# Patient Record
Sex: MALE | Race: WHITE | HISPANIC OR LATINO | Employment: OTHER | URBAN - METROPOLITAN AREA
[De-identification: names, ages, dates, MRNs, and addresses within clinical notes are randomized per-mention and may not be internally consistent; named-entity substitution may affect disease eponyms.]

---

## 2017-07-31 ENCOUNTER — PREPPED CHART (OUTPATIENT)
Dept: URBAN - METROPOLITAN AREA CLINIC 39 | Facility: CLINIC | Age: 82
End: 2017-07-31

## 2017-08-30 ASSESSMENT — TONOMETRY
OD_IOP_MMHG: 22
OS_IOP_MMHG: 19

## 2017-08-30 ASSESSMENT — VISUAL ACUITY
OS_SC: 20/40+2
OD_SC: 20/40

## 2017-09-01 ENCOUNTER — ESTABLISHED PATIENT (OUTPATIENT)
Dept: URBAN - METROPOLITAN AREA CLINIC 39 | Facility: CLINIC | Age: 82
End: 2017-09-01

## 2017-09-01 DIAGNOSIS — H43.813: ICD-10-CM

## 2017-09-01 DIAGNOSIS — Z96.1: ICD-10-CM

## 2017-09-01 DIAGNOSIS — H35.3122: ICD-10-CM

## 2017-09-01 DIAGNOSIS — H35.372: ICD-10-CM

## 2017-09-01 DIAGNOSIS — H35.3211: ICD-10-CM

## 2017-09-01 PROCEDURE — 1036F TOBACCO NON-USER: CPT

## 2017-09-01 PROCEDURE — G8756 NO BP MEASURE DOC: HCPCS

## 2017-09-01 PROCEDURE — 92012 INTRM OPH EXAM EST PATIENT: CPT

## 2017-09-01 PROCEDURE — 92242 FLUORESCEIN&ICG ANGIOGRAPHY: CPT

## 2017-09-01 PROCEDURE — 2019F DILATED MACUL EXAM DONE: CPT

## 2017-09-01 PROCEDURE — 4177F TALK PT/CRGVR RE AREDS PREV: CPT

## 2017-09-01 PROCEDURE — G8428 CUR MEDS NOT DOCUMENT: HCPCS

## 2017-09-01 PROCEDURE — 67028 INJECTION EYE DRUG: CPT

## 2017-09-01 PROCEDURE — 92134 CPTRZ OPH DX IMG PST SGM RTA: CPT

## 2017-09-01 ASSESSMENT — VISUAL ACUITY
OD_SC: 20/30-1+1
OS_PH: 20/30-2
OS_SC: 20/40-1+2

## 2017-09-01 ASSESSMENT — TONOMETRY
OD_IOP_MMHG: 18
OS_IOP_MMHG: 17

## 2017-09-29 ENCOUNTER — ESTABLISHED PATIENT (OUTPATIENT)
Dept: URBAN - METROPOLITAN AREA CLINIC 39 | Facility: CLINIC | Age: 82
End: 2017-09-29

## 2017-09-29 DIAGNOSIS — Z96.1: ICD-10-CM

## 2017-09-29 DIAGNOSIS — H43.813: ICD-10-CM

## 2017-09-29 DIAGNOSIS — H35.372: ICD-10-CM

## 2017-09-29 DIAGNOSIS — H35.3122: ICD-10-CM

## 2017-09-29 DIAGNOSIS — H35.3211: ICD-10-CM

## 2017-09-29 PROCEDURE — 92012 INTRM OPH EXAM EST PATIENT: CPT

## 2017-09-29 PROCEDURE — 1036F TOBACCO NON-USER: CPT

## 2017-09-29 PROCEDURE — 2019F DILATED MACUL EXAM DONE: CPT

## 2017-09-29 PROCEDURE — G8427 DOCREV CUR MEDS BY ELIG CLIN: HCPCS

## 2017-09-29 PROCEDURE — 67028 INJECTION EYE DRUG: CPT

## 2017-09-29 PROCEDURE — G8756 NO BP MEASURE DOC: HCPCS

## 2017-09-29 PROCEDURE — 92134 CPTRZ OPH DX IMG PST SGM RTA: CPT

## 2017-09-29 PROCEDURE — 4177F TALK PT/CRGVR RE AREDS PREV: CPT

## 2017-09-29 ASSESSMENT — TONOMETRY
OD_IOP_MMHG: 16
OS_IOP_MMHG: 16

## 2017-09-29 ASSESSMENT — VISUAL ACUITY
OS_SC: 20/40
OD_SC: 20/40

## 2017-10-10 NOTE — PATIENT DISCUSSION
Discussed resume AREDS 2 supplements, UV protection and green leafy vegetables. AREDS 2 minimums: Vitamin C 500 mg Vitamin E 400 IU Zinc 25 mg Copper 2 mg Lutein 10 mg Zeaxanthin 2 mg optional: Mesozeazanthin 10 mg and additional Lutein and Zeaxanthin.

## 2017-11-03 ENCOUNTER — ESTABLISHED PATIENT (OUTPATIENT)
Dept: URBAN - METROPOLITAN AREA CLINIC 39 | Facility: CLINIC | Age: 82
End: 2017-11-03

## 2017-11-03 DIAGNOSIS — H35.3122: ICD-10-CM

## 2017-11-03 DIAGNOSIS — H35.372: ICD-10-CM

## 2017-11-03 DIAGNOSIS — H43.813: ICD-10-CM

## 2017-11-03 DIAGNOSIS — H35.3211: ICD-10-CM

## 2017-11-03 PROCEDURE — 4177F TALK PT/CRGVR RE AREDS PREV: CPT

## 2017-11-03 PROCEDURE — 9222650 BILAT EXTENDED OPHTHALMOSCOPY, F/U

## 2017-11-03 PROCEDURE — 1036F TOBACCO NON-USER: CPT

## 2017-11-03 PROCEDURE — 92012 INTRM OPH EXAM EST PATIENT: CPT

## 2017-11-03 PROCEDURE — G8756 NO BP MEASURE DOC: HCPCS

## 2017-11-03 PROCEDURE — G8427 DOCREV CUR MEDS BY ELIG CLIN: HCPCS

## 2017-11-03 PROCEDURE — 2019F DILATED MACUL EXAM DONE: CPT

## 2017-11-03 PROCEDURE — 67028 INJECTION EYE DRUG: CPT

## 2017-11-03 PROCEDURE — 92134 CPTRZ OPH DX IMG PST SGM RTA: CPT

## 2017-11-03 ASSESSMENT — TONOMETRY
OS_IOP_MMHG: 16
OD_IOP_MMHG: 16

## 2017-11-03 ASSESSMENT — VISUAL ACUITY
OD_SC: 20/40+2
OS_SC: 20/40+2

## 2017-12-15 ENCOUNTER — ESTABLISHED PATIENT (OUTPATIENT)
Dept: URBAN - METROPOLITAN AREA CLINIC 39 | Facility: CLINIC | Age: 82
End: 2017-12-15

## 2017-12-15 DIAGNOSIS — Z96.1: ICD-10-CM

## 2017-12-15 DIAGNOSIS — H35.372: ICD-10-CM

## 2017-12-15 DIAGNOSIS — H35.3211: ICD-10-CM

## 2017-12-15 DIAGNOSIS — H35.3122: ICD-10-CM

## 2017-12-15 DIAGNOSIS — H43.813: ICD-10-CM

## 2017-12-15 PROCEDURE — 4177F TALK PT/CRGVR RE AREDS PREV: CPT

## 2017-12-15 PROCEDURE — 67028 INJECTION EYE DRUG: CPT

## 2017-12-15 PROCEDURE — 9222650 BILAT EXTENDED OPHTHALMOSCOPY, F/U

## 2017-12-15 PROCEDURE — 2019F DILATED MACUL EXAM DONE: CPT

## 2017-12-15 PROCEDURE — 1036F TOBACCO NON-USER: CPT

## 2017-12-15 PROCEDURE — 92012 INTRM OPH EXAM EST PATIENT: CPT

## 2017-12-15 PROCEDURE — 92134 CPTRZ OPH DX IMG PST SGM RTA: CPT

## 2017-12-15 PROCEDURE — G8756 NO BP MEASURE DOC: HCPCS

## 2017-12-15 PROCEDURE — G8427 DOCREV CUR MEDS BY ELIG CLIN: HCPCS

## 2017-12-15 ASSESSMENT — VISUAL ACUITY
OS_SC: 20/40
OD_SC: 20/40+1

## 2018-01-31 ENCOUNTER — ESTABLISHED PATIENT (OUTPATIENT)
Dept: URBAN - METROPOLITAN AREA CLINIC 39 | Facility: CLINIC | Age: 83
End: 2018-01-31

## 2018-01-31 DIAGNOSIS — H35.3211: ICD-10-CM

## 2018-01-31 DIAGNOSIS — H35.372: ICD-10-CM

## 2018-01-31 DIAGNOSIS — H35.3122: ICD-10-CM

## 2018-01-31 DIAGNOSIS — H43.813: ICD-10-CM

## 2018-01-31 DIAGNOSIS — H35.731: ICD-10-CM

## 2018-01-31 PROCEDURE — G8428 CUR MEDS NOT DOCUMENT: HCPCS

## 2018-01-31 PROCEDURE — 9222650 BILAT EXTENDED OPHTHALMOSCOPY, F/U

## 2018-01-31 PROCEDURE — 4177F TALK PT/CRGVR RE AREDS PREV: CPT

## 2018-01-31 PROCEDURE — 1036F TOBACCO NON-USER: CPT

## 2018-01-31 PROCEDURE — G8756 NO BP MEASURE DOC: HCPCS

## 2018-01-31 PROCEDURE — 67028 INJECTION EYE DRUG: CPT

## 2018-01-31 PROCEDURE — 2019F DILATED MACUL EXAM DONE: CPT

## 2018-01-31 PROCEDURE — 92012 INTRM OPH EXAM EST PATIENT: CPT

## 2018-01-31 PROCEDURE — 92134 CPTRZ OPH DX IMG PST SGM RTA: CPT

## 2018-01-31 ASSESSMENT — VISUAL ACUITY
OS_SC: 20/40+2
OD_SC: 20/40+1

## 2018-01-31 ASSESSMENT — TONOMETRY
OD_IOP_MMHG: 22
OS_IOP_MMHG: 19

## 2018-02-28 ENCOUNTER — ESTABLISHED PATIENT (OUTPATIENT)
Dept: URBAN - METROPOLITAN AREA CLINIC 39 | Facility: CLINIC | Age: 83
End: 2018-02-28

## 2018-02-28 DIAGNOSIS — H35.3122: ICD-10-CM

## 2018-02-28 DIAGNOSIS — H35.3211: ICD-10-CM

## 2018-02-28 DIAGNOSIS — H35.363: ICD-10-CM

## 2018-02-28 DIAGNOSIS — H35.722: ICD-10-CM

## 2018-02-28 DIAGNOSIS — H35.372: ICD-10-CM

## 2018-02-28 DIAGNOSIS — H35.731: ICD-10-CM

## 2018-02-28 DIAGNOSIS — H43.813: ICD-10-CM

## 2018-02-28 PROCEDURE — 92012 INTRM OPH EXAM EST PATIENT: CPT

## 2018-02-28 PROCEDURE — 1036F TOBACCO NON-USER: CPT

## 2018-02-28 PROCEDURE — 4177F TALK PT/CRGVR RE AREDS PREV: CPT

## 2018-02-28 PROCEDURE — 2019F DILATED MACUL EXAM DONE: CPT

## 2018-02-28 PROCEDURE — G8428 CUR MEDS NOT DOCUMENT: HCPCS

## 2018-02-28 PROCEDURE — 92242 FLUORESCEIN&ICG ANGIOGRAPHY: CPT

## 2018-02-28 PROCEDURE — 67028 INJECTION EYE DRUG: CPT

## 2018-02-28 PROCEDURE — 92134 CPTRZ OPH DX IMG PST SGM RTA: CPT

## 2018-02-28 PROCEDURE — G8756 NO BP MEASURE DOC: HCPCS

## 2018-02-28 ASSESSMENT — VISUAL ACUITY
OD_SC: 20/40
OS_SC: 20/40+1

## 2018-02-28 ASSESSMENT — TONOMETRY
OD_IOP_MMHG: 14
OS_IOP_MMHG: 16

## 2018-04-10 NOTE — PATIENT DISCUSSION
Discussed continue AREDS 2 supplements, UV protection and green leafy vegetables. AREDS 2 minimums: Vitamin C 500 mg Vitamin E 400 IU Zinc 25 mg Copper 2 mg Lutein 10 mg Zeaxanthin 2 mg optional: Mesozeazanthin 10 mg and additional Lutein and Zeaxanthin.

## 2018-04-25 ENCOUNTER — ESTABLISHED PATIENT (OUTPATIENT)
Dept: URBAN - METROPOLITAN AREA CLINIC 39 | Facility: CLINIC | Age: 83
End: 2018-04-25

## 2018-04-25 DIAGNOSIS — H35.372: ICD-10-CM

## 2018-04-25 DIAGNOSIS — H35.3211: ICD-10-CM

## 2018-04-25 DIAGNOSIS — H35.3122: ICD-10-CM

## 2018-04-25 DIAGNOSIS — H35.363: ICD-10-CM

## 2018-04-25 DIAGNOSIS — H35.722: ICD-10-CM

## 2018-04-25 DIAGNOSIS — H35.731: ICD-10-CM

## 2018-04-25 DIAGNOSIS — H43.813: ICD-10-CM

## 2018-04-25 PROCEDURE — 2019F DILATED MACUL EXAM DONE: CPT

## 2018-04-25 PROCEDURE — 67028 INJECTION EYE DRUG: CPT

## 2018-04-25 PROCEDURE — G8756 NO BP MEASURE DOC: HCPCS

## 2018-04-25 PROCEDURE — 1036F TOBACCO NON-USER: CPT

## 2018-04-25 PROCEDURE — 92134 CPTRZ OPH DX IMG PST SGM RTA: CPT

## 2018-04-25 PROCEDURE — G8427 DOCREV CUR MEDS BY ELIG CLIN: HCPCS

## 2018-04-25 PROCEDURE — 92012 INTRM OPH EXAM EST PATIENT: CPT

## 2018-04-25 PROCEDURE — 9222650 BILAT EXTENDED OPHTHALMOSCOPY, F/U

## 2018-04-25 PROCEDURE — 4177F TALK PT/CRGVR RE AREDS PREV: CPT

## 2018-04-25 ASSESSMENT — TONOMETRY
OS_IOP_MMHG: 16
OD_IOP_MMHG: 13

## 2018-04-25 ASSESSMENT — VISUAL ACUITY
OD_SC: 20/30-2
OS_SC: 20/40+1

## 2018-06-25 ENCOUNTER — ESTABLISHED PATIENT (OUTPATIENT)
Dept: URBAN - METROPOLITAN AREA CLINIC 39 | Facility: CLINIC | Age: 83
End: 2018-06-25

## 2018-06-25 DIAGNOSIS — H43.813: ICD-10-CM

## 2018-06-25 DIAGNOSIS — H35.363: ICD-10-CM

## 2018-06-25 DIAGNOSIS — H35.722: ICD-10-CM

## 2018-06-25 DIAGNOSIS — H35.3211: ICD-10-CM

## 2018-06-25 DIAGNOSIS — H35.731: ICD-10-CM

## 2018-06-25 DIAGNOSIS — H35.3122: ICD-10-CM

## 2018-06-25 DIAGNOSIS — H35.372: ICD-10-CM

## 2018-06-25 PROCEDURE — G8427 DOCREV CUR MEDS BY ELIG CLIN: HCPCS

## 2018-06-25 PROCEDURE — 92012 INTRM OPH EXAM EST PATIENT: CPT

## 2018-06-25 PROCEDURE — 2019F DILATED MACUL EXAM DONE: CPT

## 2018-06-25 PROCEDURE — 4177F TALK PT/CRGVR RE AREDS PREV: CPT

## 2018-06-25 PROCEDURE — 92134 CPTRZ OPH DX IMG PST SGM RTA: CPT

## 2018-06-25 PROCEDURE — 9222650 BILAT EXTENDED OPHTHALMOSCOPY, F/U

## 2018-06-25 PROCEDURE — 1036F TOBACCO NON-USER: CPT

## 2018-06-25 PROCEDURE — 67028 INJECTION EYE DRUG: CPT

## 2018-06-25 PROCEDURE — 92242 FLUORESCEIN&ICG ANGIOGRAPHY: CPT

## 2018-06-25 PROCEDURE — G8756 NO BP MEASURE DOC: HCPCS

## 2018-06-25 ASSESSMENT — VISUAL ACUITY
OD_SC: 20/40+2
OS_SC: 20/40+2

## 2018-06-25 ASSESSMENT — TONOMETRY
OS_IOP_MMHG: 16
OD_IOP_MMHG: 14

## 2018-07-30 ENCOUNTER — ESTABLISHED PATIENT (OUTPATIENT)
Dept: URBAN - METROPOLITAN AREA CLINIC 39 | Facility: CLINIC | Age: 83
End: 2018-07-30

## 2018-07-30 DIAGNOSIS — H35.363: ICD-10-CM

## 2018-07-30 DIAGNOSIS — H43.813: ICD-10-CM

## 2018-07-30 DIAGNOSIS — H35.731: ICD-10-CM

## 2018-07-30 DIAGNOSIS — H35.722: ICD-10-CM

## 2018-07-30 DIAGNOSIS — H35.3211: ICD-10-CM

## 2018-07-30 DIAGNOSIS — H35.372: ICD-10-CM

## 2018-07-30 DIAGNOSIS — H35.3122: ICD-10-CM

## 2018-07-30 PROCEDURE — 4177F TALK PT/CRGVR RE AREDS PREV: CPT

## 2018-07-30 PROCEDURE — 92012 INTRM OPH EXAM EST PATIENT: CPT

## 2018-07-30 PROCEDURE — 2019F DILATED MACUL EXAM DONE: CPT

## 2018-07-30 PROCEDURE — 67028 INJECTION EYE DRUG: CPT

## 2018-07-30 PROCEDURE — G8756 NO BP MEASURE DOC: HCPCS

## 2018-07-30 PROCEDURE — G8427 DOCREV CUR MEDS BY ELIG CLIN: HCPCS

## 2018-07-30 PROCEDURE — 9222650 BILAT EXTENDED OPHTHALMOSCOPY, F/U

## 2018-07-30 PROCEDURE — 92134 CPTRZ OPH DX IMG PST SGM RTA: CPT

## 2018-07-30 PROCEDURE — 1036F TOBACCO NON-USER: CPT

## 2018-07-30 ASSESSMENT — VISUAL ACUITY
OS_SC: 20/40+2
OD_SC: 20/40-1

## 2018-07-30 ASSESSMENT — TONOMETRY
OS_IOP_MMHG: 17
OD_IOP_MMHG: 17

## 2018-09-10 ENCOUNTER — ESTABLISHED PATIENT (OUTPATIENT)
Dept: URBAN - METROPOLITAN AREA CLINIC 39 | Facility: CLINIC | Age: 83
End: 2018-09-10

## 2018-09-10 DIAGNOSIS — H35.3211: ICD-10-CM

## 2018-09-10 DIAGNOSIS — H35.731: ICD-10-CM

## 2018-09-10 DIAGNOSIS — H35.363: ICD-10-CM

## 2018-09-10 DIAGNOSIS — H43.813: ICD-10-CM

## 2018-09-10 DIAGNOSIS — H35.722: ICD-10-CM

## 2018-09-10 DIAGNOSIS — H35.3122: ICD-10-CM

## 2018-09-10 DIAGNOSIS — H35.372: ICD-10-CM

## 2018-09-10 PROCEDURE — 92134 CPTRZ OPH DX IMG PST SGM RTA: CPT

## 2018-09-10 PROCEDURE — 67028 INJECTION EYE DRUG: CPT

## 2018-09-10 PROCEDURE — 9222650 BILAT EXTENDED OPHTHALMOSCOPY, F/U

## 2018-09-10 PROCEDURE — G8756 NO BP MEASURE DOC: HCPCS

## 2018-09-10 PROCEDURE — 92242 FLUORESCEIN&ICG ANGIOGRAPHY: CPT

## 2018-09-10 PROCEDURE — G8427 DOCREV CUR MEDS BY ELIG CLIN: HCPCS

## 2018-09-10 PROCEDURE — G9903 PT SCRN TBCO ID AS NON USER: HCPCS

## 2018-09-10 PROCEDURE — 4177F TALK PT/CRGVR RE AREDS PREV: CPT

## 2018-09-10 PROCEDURE — 92012 INTRM OPH EXAM EST PATIENT: CPT

## 2018-09-10 PROCEDURE — 2019F DILATED MACUL EXAM DONE: CPT

## 2018-09-10 PROCEDURE — 1036F TOBACCO NON-USER: CPT

## 2018-09-10 ASSESSMENT — VISUAL ACUITY
OD_SC: 20/40+2
OS_SC: 20/40+2

## 2018-09-10 ASSESSMENT — TONOMETRY
OD_IOP_MMHG: 12
OS_IOP_MMHG: 14

## 2018-10-29 ENCOUNTER — ESTABLISHED PATIENT (OUTPATIENT)
Dept: URBAN - METROPOLITAN AREA CLINIC 39 | Facility: CLINIC | Age: 83
End: 2018-10-29

## 2018-10-29 DIAGNOSIS — H35.363: ICD-10-CM

## 2018-10-29 DIAGNOSIS — H35.3211: ICD-10-CM

## 2018-10-29 DIAGNOSIS — H35.372: ICD-10-CM

## 2018-10-29 DIAGNOSIS — H43.813: ICD-10-CM

## 2018-10-29 DIAGNOSIS — H35.722: ICD-10-CM

## 2018-10-29 DIAGNOSIS — H35.731: ICD-10-CM

## 2018-10-29 DIAGNOSIS — H35.3122: ICD-10-CM

## 2018-10-29 PROCEDURE — 4177F TALK PT/CRGVR RE AREDS PREV: CPT

## 2018-10-29 PROCEDURE — G8756 NO BP MEASURE DOC: HCPCS

## 2018-10-29 PROCEDURE — 92134 CPTRZ OPH DX IMG PST SGM RTA: CPT

## 2018-10-29 PROCEDURE — 67028 INJECTION EYE DRUG: CPT

## 2018-10-29 PROCEDURE — G8427 DOCREV CUR MEDS BY ELIG CLIN: HCPCS

## 2018-10-29 PROCEDURE — 1036F TOBACCO NON-USER: CPT

## 2018-10-29 PROCEDURE — 92012 INTRM OPH EXAM EST PATIENT: CPT

## 2018-10-29 PROCEDURE — 9222650 BILAT EXTENDED OPHTHALMOSCOPY, F/U

## 2018-10-29 PROCEDURE — 2019F DILATED MACUL EXAM DONE: CPT

## 2018-10-29 PROCEDURE — G9903 PT SCRN TBCO ID AS NON USER: HCPCS

## 2018-10-29 ASSESSMENT — TONOMETRY
OD_IOP_MMHG: 20
OS_IOP_MMHG: 19

## 2018-10-29 ASSESSMENT — VISUAL ACUITY
OS_SC: 20/40-2
OD_SC: 20/50-2

## 2018-12-10 ENCOUNTER — ESTABLISHED PATIENT (OUTPATIENT)
Dept: URBAN - METROPOLITAN AREA CLINIC 39 | Facility: CLINIC | Age: 83
End: 2018-12-10

## 2018-12-10 DIAGNOSIS — H35.3122: ICD-10-CM

## 2018-12-10 DIAGNOSIS — H35.363: ICD-10-CM

## 2018-12-10 DIAGNOSIS — H35.3211: ICD-10-CM

## 2018-12-10 DIAGNOSIS — H35.731: ICD-10-CM

## 2018-12-10 DIAGNOSIS — H43.813: ICD-10-CM

## 2018-12-10 DIAGNOSIS — H35.722: ICD-10-CM

## 2018-12-10 DIAGNOSIS — H35.372: ICD-10-CM

## 2018-12-10 PROCEDURE — 92134 CPTRZ OPH DX IMG PST SGM RTA: CPT

## 2018-12-10 PROCEDURE — 67028 INJECTION EYE DRUG: CPT

## 2018-12-10 PROCEDURE — 9222650 BILAT EXTENDED OPHTHALMOSCOPY, F/U

## 2018-12-10 PROCEDURE — 4177F TALK PT/CRGVR RE AREDS PREV: CPT

## 2018-12-10 PROCEDURE — G8428 CUR MEDS NOT DOCUMENT: HCPCS

## 2018-12-10 PROCEDURE — 1036F TOBACCO NON-USER: CPT

## 2018-12-10 PROCEDURE — 2019F DILATED MACUL EXAM DONE: CPT

## 2018-12-10 PROCEDURE — 92242 FLUORESCEIN&ICG ANGIOGRAPHY: CPT

## 2018-12-10 PROCEDURE — G9903 PT SCRN TBCO ID AS NON USER: HCPCS

## 2018-12-10 PROCEDURE — G8756 NO BP MEASURE DOC: HCPCS

## 2018-12-10 PROCEDURE — 92012 INTRM OPH EXAM EST PATIENT: CPT

## 2018-12-10 ASSESSMENT — VISUAL ACUITY
OD_SC: 20/50-2
OS_SC: 20/40-1

## 2018-12-10 ASSESSMENT — TONOMETRY
OD_IOP_MMHG: 19
OS_IOP_MMHG: 19

## 2019-01-21 ENCOUNTER — ESTABLISHED PATIENT (OUTPATIENT)
Dept: URBAN - METROPOLITAN AREA CLINIC 39 | Facility: CLINIC | Age: 84
End: 2019-01-21

## 2019-01-21 DIAGNOSIS — H35.3122: ICD-10-CM

## 2019-01-21 DIAGNOSIS — H35.731: ICD-10-CM

## 2019-01-21 DIAGNOSIS — H35.722: ICD-10-CM

## 2019-01-21 DIAGNOSIS — H35.3211: ICD-10-CM

## 2019-01-21 DIAGNOSIS — H35.372: ICD-10-CM

## 2019-01-21 PROCEDURE — 4177F TALK PT/CRGVR RE AREDS PREV: CPT

## 2019-01-21 PROCEDURE — 1036F TOBACCO NON-USER: CPT

## 2019-01-21 PROCEDURE — G8756 NO BP MEASURE DOC: HCPCS

## 2019-01-21 PROCEDURE — G9903 PT SCRN TBCO ID AS NON USER: HCPCS

## 2019-01-21 PROCEDURE — G8428 CUR MEDS NOT DOCUMENT: HCPCS

## 2019-01-21 PROCEDURE — 92134 CPTRZ OPH DX IMG PST SGM RTA: CPT

## 2019-01-21 PROCEDURE — 67028 INJECTION EYE DRUG: CPT

## 2019-01-21 PROCEDURE — 92012 INTRM OPH EXAM EST PATIENT: CPT

## 2019-01-21 PROCEDURE — 2019F DILATED MACUL EXAM DONE: CPT

## 2019-01-21 ASSESSMENT — VISUAL ACUITY
OD_SC: 20/40-1
OS_SC: 20/40

## 2019-01-21 ASSESSMENT — TONOMETRY
OS_IOP_MMHG: 19
OD_IOP_MMHG: 20

## 2019-03-04 ENCOUNTER — ESTABLISHED PATIENT (OUTPATIENT)
Dept: URBAN - METROPOLITAN AREA CLINIC 39 | Facility: CLINIC | Age: 84
End: 2019-03-04

## 2019-03-04 DIAGNOSIS — H35.3122: ICD-10-CM

## 2019-03-04 DIAGNOSIS — H35.3211: ICD-10-CM

## 2019-03-04 DIAGNOSIS — H35.722: ICD-10-CM

## 2019-03-04 DIAGNOSIS — H35.731: ICD-10-CM

## 2019-03-04 PROCEDURE — 92134 CPTRZ OPH DX IMG PST SGM RTA: CPT

## 2019-03-04 PROCEDURE — 67028 INJECTION EYE DRUG: CPT

## 2019-03-04 PROCEDURE — 92242 FLUORESCEIN&ICG ANGIOGRAPHY: CPT

## 2019-03-04 PROCEDURE — 92012 INTRM OPH EXAM EST PATIENT: CPT

## 2019-03-04 ASSESSMENT — VISUAL ACUITY
OD_SC: 20/50
OS_SC: 20/40

## 2019-03-04 ASSESSMENT — TONOMETRY
OD_IOP_MMHG: 16
OS_IOP_MMHG: 15

## 2019-04-08 ENCOUNTER — ESTABLISHED PATIENT (OUTPATIENT)
Dept: URBAN - METROPOLITAN AREA CLINIC 39 | Facility: CLINIC | Age: 84
End: 2019-04-08

## 2019-04-08 DIAGNOSIS — H35.3211: ICD-10-CM

## 2019-04-08 DIAGNOSIS — H35.3122: ICD-10-CM

## 2019-04-08 PROCEDURE — 92012 INTRM OPH EXAM EST PATIENT: CPT

## 2019-04-08 PROCEDURE — 92134 CPTRZ OPH DX IMG PST SGM RTA: CPT

## 2019-04-08 PROCEDURE — 67028 INJECTION EYE DRUG: CPT

## 2019-04-08 ASSESSMENT — VISUAL ACUITY
OD_SC: 20/40-1
OS_SC: 20/40+2

## 2019-04-08 ASSESSMENT — TONOMETRY
OS_IOP_MMHG: 19
OD_IOP_MMHG: 18

## 2019-05-13 ENCOUNTER — ESTABLISHED PATIENT (OUTPATIENT)
Dept: URBAN - METROPOLITAN AREA CLINIC 39 | Facility: CLINIC | Age: 84
End: 2019-05-13

## 2019-05-13 DIAGNOSIS — H35.731: ICD-10-CM

## 2019-05-13 DIAGNOSIS — H35.722: ICD-10-CM

## 2019-05-13 DIAGNOSIS — H35.3122: ICD-10-CM

## 2019-05-13 DIAGNOSIS — H35.3211: ICD-10-CM

## 2019-05-13 PROCEDURE — 92134 CPTRZ OPH DX IMG PST SGM RTA: CPT

## 2019-05-13 PROCEDURE — 92012 INTRM OPH EXAM EST PATIENT: CPT

## 2019-05-13 PROCEDURE — 67028 INJECTION EYE DRUG: CPT

## 2019-05-13 PROCEDURE — 92250 FUNDUS PHOTOGRAPHY W/I&R: CPT

## 2019-05-13 PROCEDURE — 9222650 BILAT EXTENDED OPHTHALMOSCOPY, F/U

## 2019-05-13 PROCEDURE — 92235 FLUORESCEIN ANGRPH MLTIFRAME: CPT

## 2019-05-13 ASSESSMENT — VISUAL ACUITY
OS_SC: 20/40+2
OD_SC: 20/40-1

## 2019-05-13 ASSESSMENT — TONOMETRY
OD_IOP_MMHG: 10
OS_IOP_MMHG: 11

## 2019-06-24 ENCOUNTER — ESTABLISHED PATIENT (OUTPATIENT)
Dept: URBAN - METROPOLITAN AREA CLINIC 39 | Facility: CLINIC | Age: 84
End: 2019-06-24

## 2019-06-24 DIAGNOSIS — H35.3211: ICD-10-CM

## 2019-06-24 DIAGNOSIS — H35.3122: ICD-10-CM

## 2019-06-24 PROCEDURE — 92012 INTRM OPH EXAM EST PATIENT: CPT

## 2019-06-24 PROCEDURE — 67028 INJECTION EYE DRUG: CPT

## 2019-06-24 PROCEDURE — 92250 FUNDUS PHOTOGRAPHY W/I&R: CPT

## 2019-06-24 ASSESSMENT — TONOMETRY
OD_IOP_MMHG: 14
OS_IOP_MMHG: 13

## 2019-06-24 ASSESSMENT — VISUAL ACUITY
OS_SC: 20/40+2
OD_SC: 20/40-1

## 2019-07-29 ENCOUNTER — ESTABLISHED PATIENT (OUTPATIENT)
Dept: URBAN - METROPOLITAN AREA CLINIC 39 | Facility: CLINIC | Age: 84
End: 2019-07-29

## 2019-07-29 DIAGNOSIS — H35.3211: ICD-10-CM

## 2019-07-29 DIAGNOSIS — H35.3122: ICD-10-CM

## 2019-07-29 DIAGNOSIS — H35.722: ICD-10-CM

## 2019-07-29 DIAGNOSIS — H35.731: ICD-10-CM

## 2019-07-29 PROCEDURE — 92235 FLUORESCEIN ANGRPH MLTIFRAME: CPT

## 2019-07-29 PROCEDURE — 67028 INJECTION EYE DRUG: CPT

## 2019-07-29 PROCEDURE — 9222650 BILAT EXTENDED OPHTHALMOSCOPY, F/U

## 2019-07-29 PROCEDURE — 92012 INTRM OPH EXAM EST PATIENT: CPT

## 2019-07-29 PROCEDURE — 92250 FUNDUS PHOTOGRAPHY W/I&R: CPT

## 2019-07-29 ASSESSMENT — VISUAL ACUITY
OS_SC: 20/40-1
OD_SC: 20/40-1

## 2019-07-29 ASSESSMENT — TONOMETRY
OD_IOP_MMHG: 15
OS_IOP_MMHG: 16

## 2019-08-28 ENCOUNTER — ESTABLISHED PATIENT (OUTPATIENT)
Dept: URBAN - METROPOLITAN AREA CLINIC 39 | Facility: CLINIC | Age: 84
End: 2019-08-28

## 2019-08-28 DIAGNOSIS — H35.3211: ICD-10-CM

## 2019-08-28 DIAGNOSIS — H35.3122: ICD-10-CM

## 2019-08-28 PROCEDURE — 92012 INTRM OPH EXAM EST PATIENT: CPT

## 2019-08-28 PROCEDURE — 67028 INJECTION EYE DRUG: CPT

## 2019-08-28 PROCEDURE — 92134 CPTRZ OPH DX IMG PST SGM RTA: CPT

## 2019-08-28 ASSESSMENT — VISUAL ACUITY
OD_SC: 20/40-1
OS_PH: 20/50
OS_SC: 20/60

## 2019-08-28 ASSESSMENT — TONOMETRY
OD_IOP_MMHG: 16
OS_IOP_MMHG: 16

## 2019-09-30 ENCOUNTER — ESTABLISHED PATIENT (OUTPATIENT)
Dept: URBAN - METROPOLITAN AREA CLINIC 39 | Facility: CLINIC | Age: 84
End: 2019-09-30

## 2019-09-30 DIAGNOSIS — H35.3211: ICD-10-CM

## 2019-09-30 DIAGNOSIS — H35.731: ICD-10-CM

## 2019-09-30 DIAGNOSIS — H35.722: ICD-10-CM

## 2019-09-30 DIAGNOSIS — H35.3122: ICD-10-CM

## 2019-09-30 PROCEDURE — 67028 INJECTION EYE DRUG: CPT

## 2019-09-30 PROCEDURE — 92235 FLUORESCEIN ANGRPH MLTIFRAME: CPT

## 2019-09-30 PROCEDURE — 92250 FUNDUS PHOTOGRAPHY W/I&R: CPT

## 2019-09-30 PROCEDURE — 92012 INTRM OPH EXAM EST PATIENT: CPT

## 2019-09-30 PROCEDURE — 9222650 BILAT EXTENDED OPHTHALMOSCOPY, F/U

## 2019-09-30 PROCEDURE — 92134 CPTRZ OPH DX IMG PST SGM RTA: CPT

## 2019-09-30 ASSESSMENT — TONOMETRY
OS_IOP_MMHG: 13
OD_IOP_MMHG: 14

## 2019-09-30 ASSESSMENT — VISUAL ACUITY
OD_SC: 20/50-1
OS_SC: 20/60+2

## 2019-11-04 ENCOUNTER — ESTABLISHED PATIENT (OUTPATIENT)
Dept: URBAN - METROPOLITAN AREA CLINIC 39 | Facility: CLINIC | Age: 84
End: 2019-11-04

## 2019-11-04 DIAGNOSIS — H35.3122: ICD-10-CM

## 2019-11-04 DIAGNOSIS — H35.3211: ICD-10-CM

## 2019-11-04 PROCEDURE — 92134 CPTRZ OPH DX IMG PST SGM RTA: CPT

## 2019-11-04 PROCEDURE — 67028 INJECTION EYE DRUG: CPT

## 2019-11-04 PROCEDURE — 92012 INTRM OPH EXAM EST PATIENT: CPT

## 2019-11-04 ASSESSMENT — VISUAL ACUITY
OD_SC: 20/50+2
OS_SC: 20/50-2

## 2019-11-04 ASSESSMENT — TONOMETRY
OS_IOP_MMHG: 16
OD_IOP_MMHG: 14

## 2019-12-09 ENCOUNTER — ESTABLISHED PATIENT (OUTPATIENT)
Dept: URBAN - METROPOLITAN AREA CLINIC 39 | Facility: CLINIC | Age: 84
End: 2019-12-09

## 2019-12-09 DIAGNOSIS — H35.731: ICD-10-CM

## 2019-12-09 DIAGNOSIS — H35.3211: ICD-10-CM

## 2019-12-09 DIAGNOSIS — H35.722: ICD-10-CM

## 2019-12-09 DIAGNOSIS — H35.3122: ICD-10-CM

## 2019-12-09 PROCEDURE — 92012 INTRM OPH EXAM EST PATIENT: CPT

## 2019-12-09 PROCEDURE — 92235 FLUORESCEIN ANGRPH MLTIFRAME: CPT

## 2019-12-09 PROCEDURE — 92250 FUNDUS PHOTOGRAPHY W/I&R: CPT

## 2019-12-09 PROCEDURE — 67028 INJECTION EYE DRUG: CPT

## 2019-12-09 PROCEDURE — 9222650 BILAT EXTENDED OPHTHALMOSCOPY, F/U

## 2019-12-09 ASSESSMENT — VISUAL ACUITY
OS_SC: 20/40
OD_SC: 20/40

## 2019-12-09 ASSESSMENT — TONOMETRY
OD_IOP_MMHG: 15
OS_IOP_MMHG: 16

## 2020-01-20 ENCOUNTER — ESTABLISHED PATIENT (OUTPATIENT)
Dept: URBAN - METROPOLITAN AREA CLINIC 39 | Facility: CLINIC | Age: 85
End: 2020-01-20

## 2020-01-20 DIAGNOSIS — H35.3122: ICD-10-CM

## 2020-01-20 DIAGNOSIS — H35.3211: ICD-10-CM

## 2020-01-20 PROCEDURE — 92134 CPTRZ OPH DX IMG PST SGM RTA: CPT

## 2020-01-20 PROCEDURE — 92012 INTRM OPH EXAM EST PATIENT: CPT

## 2020-01-20 PROCEDURE — 67028 INJECTION EYE DRUG: CPT

## 2020-01-20 ASSESSMENT — TONOMETRY
OS_IOP_MMHG: 18
OD_IOP_MMHG: 18

## 2020-01-20 ASSESSMENT — VISUAL ACUITY
OD_SC: 20/40-1
OS_SC: 20/40-1

## 2020-02-17 ENCOUNTER — ESTABLISHED PATIENT (OUTPATIENT)
Dept: URBAN - METROPOLITAN AREA CLINIC 39 | Facility: CLINIC | Age: 85
End: 2020-02-17

## 2020-02-17 VITALS — SYSTOLIC BLOOD PRESSURE: 130 MMHG | DIASTOLIC BLOOD PRESSURE: 62 MMHG | HEIGHT: 60 IN

## 2020-02-17 DIAGNOSIS — H35.3211: ICD-10-CM

## 2020-02-17 DIAGNOSIS — H35.3122: ICD-10-CM

## 2020-02-17 DIAGNOSIS — H35.722: ICD-10-CM

## 2020-02-17 DIAGNOSIS — H35.731: ICD-10-CM

## 2020-02-17 PROCEDURE — 92235 FLUORESCEIN ANGRPH MLTIFRAME: CPT

## 2020-02-17 PROCEDURE — J0178PRE EYLEA PREFILLED SYRINGE

## 2020-02-17 PROCEDURE — 92250 FUNDUS PHOTOGRAPHY W/I&R: CPT

## 2020-02-17 PROCEDURE — 67028 INJECTION EYE DRUG: CPT

## 2020-02-17 PROCEDURE — 92012 INTRM OPH EXAM EST PATIENT: CPT

## 2020-02-17 ASSESSMENT — TONOMETRY
OD_IOP_MMHG: 18
OS_IOP_MMHG: 18

## 2020-02-17 ASSESSMENT — VISUAL ACUITY
OD_SC: 20/50+2
OS_SC: 20/40-1

## 2020-03-23 ENCOUNTER — ESTABLISHED PATIENT (OUTPATIENT)
Dept: URBAN - METROPOLITAN AREA CLINIC 39 | Facility: CLINIC | Age: 85
End: 2020-03-23

## 2020-03-23 DIAGNOSIS — H35.3122: ICD-10-CM

## 2020-03-23 DIAGNOSIS — H35.3211: ICD-10-CM

## 2020-03-23 PROCEDURE — 67028 INJECTION EYE DRUG: CPT

## 2020-03-23 PROCEDURE — 92201 OPSCPY EXTND RTA DRAW UNI/BI: CPT

## 2020-03-23 PROCEDURE — 92012 INTRM OPH EXAM EST PATIENT: CPT

## 2020-03-23 PROCEDURE — J0178PRE EYLEA PREFILLED SYRINGE

## 2020-03-23 PROCEDURE — 92134 CPTRZ OPH DX IMG PST SGM RTA: CPT

## 2020-03-23 ASSESSMENT — VISUAL ACUITY
OS_SC: 20/60+1
OD_SC: 20/50-1

## 2020-03-23 ASSESSMENT — TONOMETRY
OD_IOP_MMHG: 19
OS_IOP_MMHG: 20

## 2020-05-04 ENCOUNTER — ESTABLISHED PATIENT (OUTPATIENT)
Dept: URBAN - METROPOLITAN AREA CLINIC 39 | Facility: CLINIC | Age: 85
End: 2020-05-04

## 2020-05-04 DIAGNOSIS — H35.033: ICD-10-CM

## 2020-05-04 DIAGNOSIS — H35.722: ICD-10-CM

## 2020-05-04 DIAGNOSIS — H35.372: ICD-10-CM

## 2020-05-04 DIAGNOSIS — H35.3122: ICD-10-CM

## 2020-05-04 DIAGNOSIS — H43.813: ICD-10-CM

## 2020-05-04 DIAGNOSIS — H35.3211: ICD-10-CM

## 2020-05-04 DIAGNOSIS — H35.363: ICD-10-CM

## 2020-05-04 DIAGNOSIS — H35.731: ICD-10-CM

## 2020-05-04 PROCEDURE — J0178PRE EYLEA PREFILLED SYRINGE

## 2020-05-04 PROCEDURE — 92235 FLUORESCEIN ANGRPH MLTIFRAME: CPT

## 2020-05-04 PROCEDURE — 67028 INJECTION EYE DRUG: CPT

## 2020-05-04 PROCEDURE — 92014 COMPRE OPH EXAM EST PT 1/>: CPT

## 2020-05-04 PROCEDURE — 92134 CPTRZ OPH DX IMG PST SGM RTA: CPT

## 2020-05-04 PROCEDURE — 92202 OPSCPY EXTND ON/MAC DRAW: CPT

## 2020-05-04 ASSESSMENT — VISUAL ACUITY
OD_PH: 20/50
OD_SC: 20/60+1
OS_SC: 20/40

## 2020-06-08 ENCOUNTER — ESTABLISHED PATIENT (OUTPATIENT)
Dept: URBAN - METROPOLITAN AREA CLINIC 39 | Facility: CLINIC | Age: 85
End: 2020-06-08

## 2020-06-08 DIAGNOSIS — H35.033: ICD-10-CM

## 2020-06-08 DIAGNOSIS — H35.363: ICD-10-CM

## 2020-06-08 DIAGNOSIS — H43.813: ICD-10-CM

## 2020-06-08 DIAGNOSIS — H35.3122: ICD-10-CM

## 2020-06-08 DIAGNOSIS — H35.3211: ICD-10-CM

## 2020-06-08 DIAGNOSIS — H35.722: ICD-10-CM

## 2020-06-08 DIAGNOSIS — H35.372: ICD-10-CM

## 2020-06-08 DIAGNOSIS — H35.731: ICD-10-CM

## 2020-06-08 PROCEDURE — 92134 CPTRZ OPH DX IMG PST SGM RTA: CPT

## 2020-06-08 PROCEDURE — 92201 OPSCPY EXTND RTA DRAW UNI/BI: CPT

## 2020-06-08 PROCEDURE — 92014 COMPRE OPH EXAM EST PT 1/>: CPT

## 2020-06-08 PROCEDURE — J0178PRE EYLEA PREFILLED SYRINGE

## 2020-06-08 PROCEDURE — 67028 INJECTION EYE DRUG: CPT

## 2020-06-09 ASSESSMENT — VISUAL ACUITY
OD_CC: 20/40-2
OS_CC: 20/60

## 2020-07-13 ENCOUNTER — ESTABLISHED PATIENT (OUTPATIENT)
Dept: URBAN - METROPOLITAN AREA CLINIC 39 | Facility: CLINIC | Age: 85
End: 2020-07-13

## 2020-07-13 DIAGNOSIS — H35.3122: ICD-10-CM

## 2020-07-13 DIAGNOSIS — H35.3211: ICD-10-CM

## 2020-07-13 DIAGNOSIS — H35.731: ICD-10-CM

## 2020-07-13 DIAGNOSIS — H35.722: ICD-10-CM

## 2020-07-13 PROCEDURE — 92012 INTRM OPH EXAM EST PATIENT: CPT

## 2020-07-13 PROCEDURE — 92242 FLUORESCEIN&ICG ANGIOGRAPHY: CPT

## 2020-07-13 PROCEDURE — 92201 OPSCPY EXTND RTA DRAW UNI/BI: CPT

## 2020-07-13 PROCEDURE — J0178PRE EYLEA PREFILLED SYRINGE

## 2020-07-13 PROCEDURE — 92134 CPTRZ OPH DX IMG PST SGM RTA: CPT

## 2020-07-13 PROCEDURE — 67028 INJECTION EYE DRUG: CPT

## 2020-07-13 ASSESSMENT — TONOMETRY
OS_IOP_MMHG: 20
OD_IOP_MMHG: 19

## 2020-07-13 ASSESSMENT — VISUAL ACUITY
OS_SC: 20/60
OD_SC: 20/60

## 2020-08-17 ENCOUNTER — ESTABLISHED PATIENT (OUTPATIENT)
Dept: URBAN - METROPOLITAN AREA CLINIC 39 | Facility: CLINIC | Age: 85
End: 2020-08-17

## 2020-08-17 DIAGNOSIS — H35.3122: ICD-10-CM

## 2020-08-17 DIAGNOSIS — H35.722: ICD-10-CM

## 2020-08-17 DIAGNOSIS — H35.372: ICD-10-CM

## 2020-08-17 DIAGNOSIS — H35.3211: ICD-10-CM

## 2020-08-17 DIAGNOSIS — H35.731: ICD-10-CM

## 2020-08-17 PROCEDURE — J0178PRE EYLEA PREFILLED SYRINGE

## 2020-08-17 PROCEDURE — 92202 OPSCPY EXTND ON/MAC DRAW: CPT

## 2020-08-17 PROCEDURE — 92012 INTRM OPH EXAM EST PATIENT: CPT

## 2020-08-17 PROCEDURE — 67028 INJECTION EYE DRUG: CPT

## 2020-08-17 PROCEDURE — 92134 CPTRZ OPH DX IMG PST SGM RTA: CPT

## 2020-08-17 ASSESSMENT — TONOMETRY
OS_IOP_MMHG: 19
OD_IOP_MMHG: 18

## 2020-08-17 ASSESSMENT — VISUAL ACUITY
OS_SC: 20/60-1
OD_SC: 20/60-1

## 2020-09-28 ENCOUNTER — ESTABLISHED PATIENT (OUTPATIENT)
Dept: URBAN - METROPOLITAN AREA CLINIC 39 | Facility: CLINIC | Age: 85
End: 2020-09-28

## 2020-09-28 DIAGNOSIS — H35.731: ICD-10-CM

## 2020-09-28 DIAGNOSIS — H35.3122: ICD-10-CM

## 2020-09-28 DIAGNOSIS — H35.3211: ICD-10-CM

## 2020-09-28 DIAGNOSIS — H35.722: ICD-10-CM

## 2020-09-28 PROCEDURE — 92134 CPTRZ OPH DX IMG PST SGM RTA: CPT

## 2020-09-28 PROCEDURE — J0178PRE EYLEA PREFILLED SYRINGE

## 2020-09-28 PROCEDURE — 92012 INTRM OPH EXAM EST PATIENT: CPT

## 2020-09-28 PROCEDURE — 67028 INJECTION EYE DRUG: CPT

## 2020-09-28 PROCEDURE — 92202 OPSCPY EXTND ON/MAC DRAW: CPT

## 2020-09-28 PROCEDURE — 92242 FLUORESCEIN&ICG ANGIOGRAPHY: CPT

## 2020-09-28 ASSESSMENT — TONOMETRY
OD_IOP_MMHG: 13
OS_IOP_MMHG: 11

## 2020-09-28 ASSESSMENT — VISUAL ACUITY
OD_SC: 20/50-1
OS_SC: 20/60-1

## 2020-11-02 ENCOUNTER — ESTABLISHED PATIENT (OUTPATIENT)
Dept: URBAN - METROPOLITAN AREA CLINIC 39 | Facility: CLINIC | Age: 85
End: 2020-11-02

## 2020-11-02 DIAGNOSIS — H35.3211: ICD-10-CM

## 2020-11-02 DIAGNOSIS — H35.3122: ICD-10-CM

## 2020-11-02 DIAGNOSIS — H35.722: ICD-10-CM

## 2020-11-02 DIAGNOSIS — H35.731: ICD-10-CM

## 2020-11-02 PROCEDURE — J0178PRE EYLEA PREFILLED SYRINGE

## 2020-11-02 PROCEDURE — 67028 INJECTION EYE DRUG: CPT

## 2020-11-02 PROCEDURE — 92134 CPTRZ OPH DX IMG PST SGM RTA: CPT

## 2020-11-02 PROCEDURE — 92012 INTRM OPH EXAM EST PATIENT: CPT

## 2020-11-02 PROCEDURE — 92202 OPSCPY EXTND ON/MAC DRAW: CPT

## 2020-11-02 ASSESSMENT — TONOMETRY
OS_IOP_MMHG: 16
OD_IOP_MMHG: 14

## 2020-11-02 ASSESSMENT — VISUAL ACUITY
OS_SC: 20/50-1
OD_SC: 20/60-2

## 2020-11-09 NOTE — PATIENT DISCUSSION
02/10/20 1000   Team Meeting   Meeting Type Tx Team Meeting   Initial Conference Date 02/10/20   Next Conference Date 02/17/20   Team Members Present   Team Members Present Physician;Nurse;; Other (Discipline and Name)   Physician Team Member Dr Margy Real Team Member Brannon Newberry, RN   Care Management Team Member Lily Hess   Other (Discipline and Name) RACHELE Jimenez; Anca Wills, [facility]   Patient/Family Present   Patient Present Yes   Patient's Family Present No     Patient attended treatment team meeting this morning without his self assessment completed  Patient attended 100% of groups offered last week  Patient is looking forward to the dental appointment for his dental cleaning this week  He was granted off grounds independent of staff this week and will be contacting his "big brother" to get a pass arranged this weekend  Team and patient completed risk assessment and the patient did not verbalize any desire to elope from the program  Patient verbalized understanding of consequences of eloping from treatment while on a commitment  Patient verbalized no further questions or concerns at the conclusion of the meeting  Advised to call immediately if any worsening distortion or blurring is noted.

## 2020-11-09 NOTE — PATIENT DISCUSSION
Pt was given new Rx for glasses but currently wearing old Rx. Recommend pt get new glasses as vision improved with new Rx.

## 2020-11-09 NOTE — PATIENT DISCUSSION
No SRF/IRF seen on exam. AMD remains persistent but without progression. Continue with Amsler grid and AREDS 2. Avoid direct or indirect smoking. The possibility of progression was discussed.

## 2020-11-09 NOTE — PATIENT DISCUSSION
Edu patient reason for foggy vision is likely due to dry eyes. Recommend Artificial tears QID and W/C at night.

## 2020-12-07 ENCOUNTER — ESTABLISHED PATIENT (OUTPATIENT)
Dept: URBAN - METROPOLITAN AREA CLINIC 39 | Facility: CLINIC | Age: 85
End: 2020-12-07

## 2020-12-07 DIAGNOSIS — H35.3211: ICD-10-CM

## 2020-12-07 DIAGNOSIS — H35.3122: ICD-10-CM

## 2020-12-07 DIAGNOSIS — H35.731: ICD-10-CM

## 2020-12-07 DIAGNOSIS — H35.722: ICD-10-CM

## 2020-12-07 PROCEDURE — 92242 FLUORESCEIN&ICG ANGIOGRAPHY: CPT

## 2020-12-07 PROCEDURE — J0178PRE EYLEA PREFILLED SYRINGE

## 2020-12-07 PROCEDURE — 92202 OPSCPY EXTND ON/MAC DRAW: CPT

## 2020-12-07 PROCEDURE — 67028 INJECTION EYE DRUG: CPT

## 2020-12-07 PROCEDURE — 92134 CPTRZ OPH DX IMG PST SGM RTA: CPT

## 2020-12-07 PROCEDURE — 92012 INTRM OPH EXAM EST PATIENT: CPT

## 2020-12-07 ASSESSMENT — TONOMETRY
OS_IOP_MMHG: 18
OD_IOP_MMHG: 19

## 2020-12-07 ASSESSMENT — VISUAL ACUITY
OD_SC: 20/70-2
OD_PH: 20/60
OS_SC: 20/60-2
OS_PH: 20/50-1

## 2021-01-11 ENCOUNTER — ESTABLISHED PATIENT (OUTPATIENT)
Dept: URBAN - METROPOLITAN AREA CLINIC 39 | Facility: CLINIC | Age: 86
End: 2021-01-11

## 2021-01-11 DIAGNOSIS — H35.722: ICD-10-CM

## 2021-01-11 DIAGNOSIS — H35.731: ICD-10-CM

## 2021-01-11 DIAGNOSIS — H35.3211: ICD-10-CM

## 2021-01-11 DIAGNOSIS — H35.372: ICD-10-CM

## 2021-01-11 DIAGNOSIS — H35.3122: ICD-10-CM

## 2021-01-11 PROCEDURE — J0178PRE EYLEA PREFILLED SYRINGE

## 2021-01-11 PROCEDURE — 92134 CPTRZ OPH DX IMG PST SGM RTA: CPT

## 2021-01-11 PROCEDURE — 99213 OFFICE O/P EST LOW 20 MIN: CPT

## 2021-01-11 PROCEDURE — 67028 INJECTION EYE DRUG: CPT

## 2021-01-11 PROCEDURE — 92202 OPSCPY EXTND ON/MAC DRAW: CPT

## 2021-01-11 ASSESSMENT — VISUAL ACUITY
OD_SC: 20/60-1
OS_SC: 20/60-2

## 2021-01-11 ASSESSMENT — TONOMETRY
OS_IOP_MMHG: 21
OD_IOP_MMHG: 18

## 2021-02-10 ENCOUNTER — ESTABLISHED PATIENT (OUTPATIENT)
Dept: URBAN - METROPOLITAN AREA CLINIC 39 | Facility: CLINIC | Age: 86
End: 2021-02-10

## 2021-02-10 DIAGNOSIS — H35.363: ICD-10-CM

## 2021-02-10 DIAGNOSIS — H35.033: ICD-10-CM

## 2021-02-10 DIAGNOSIS — H35.722: ICD-10-CM

## 2021-02-10 DIAGNOSIS — H35.372: ICD-10-CM

## 2021-02-10 DIAGNOSIS — H35.731: ICD-10-CM

## 2021-02-10 DIAGNOSIS — H35.3211: ICD-10-CM

## 2021-02-10 DIAGNOSIS — H43.813: ICD-10-CM

## 2021-02-10 DIAGNOSIS — H35.3122: ICD-10-CM

## 2021-02-10 PROCEDURE — J0178PRE EYLEA PREFILLED SYRINGE

## 2021-02-10 PROCEDURE — 92250 FUNDUS PHOTOGRAPHY W/I&R: CPT

## 2021-02-10 PROCEDURE — 99213 OFFICE O/P EST LOW 20 MIN: CPT

## 2021-02-10 PROCEDURE — 67028 INJECTION EYE DRUG: CPT

## 2021-02-10 ASSESSMENT — TONOMETRY
OD_IOP_MMHG: 14
OS_IOP_MMHG: 14

## 2021-02-10 ASSESSMENT — VISUAL ACUITY
OD_SC: 20/60-2
OS_SC: 20/50-1

## 2021-03-10 ENCOUNTER — ESTABLISHED PATIENT (OUTPATIENT)
Dept: URBAN - METROPOLITAN AREA CLINIC 39 | Facility: CLINIC | Age: 86
End: 2021-03-10

## 2021-03-10 DIAGNOSIS — H35.3122: ICD-10-CM

## 2021-03-10 DIAGNOSIS — H35.372: ICD-10-CM

## 2021-03-10 DIAGNOSIS — H35.722: ICD-10-CM

## 2021-03-10 DIAGNOSIS — H35.033: ICD-10-CM

## 2021-03-10 DIAGNOSIS — H35.3211: ICD-10-CM

## 2021-03-10 DIAGNOSIS — H35.731: ICD-10-CM

## 2021-03-10 PROCEDURE — 92202 OPSCPY EXTND ON/MAC DRAW: CPT

## 2021-03-10 PROCEDURE — J0178PRE EYLEA PREFILLED SYRINGE

## 2021-03-10 PROCEDURE — 92134 CPTRZ OPH DX IMG PST SGM RTA: CPT

## 2021-03-10 PROCEDURE — 67028 INJECTION EYE DRUG: CPT

## 2021-03-10 PROCEDURE — 92242 FLUORESCEIN&ICG ANGIOGRAPHY: CPT

## 2021-03-10 PROCEDURE — 99213 OFFICE O/P EST LOW 20 MIN: CPT

## 2021-03-10 ASSESSMENT — VISUAL ACUITY
OS_SC: 20/70-1
OD_PH: 20/50
OS_PH: 20/50-2
OD_SC: 20/60

## 2021-03-10 ASSESSMENT — TONOMETRY
OD_IOP_MMHG: 22
OS_IOP_MMHG: 20

## 2021-04-12 ENCOUNTER — ESTABLISHED PATIENT (OUTPATIENT)
Dept: URBAN - METROPOLITAN AREA CLINIC 39 | Facility: CLINIC | Age: 86
End: 2021-04-12

## 2021-04-12 DIAGNOSIS — H35.722: ICD-10-CM

## 2021-04-12 DIAGNOSIS — H35.3211: ICD-10-CM

## 2021-04-12 DIAGNOSIS — H35.3122: ICD-10-CM

## 2021-04-12 DIAGNOSIS — H35.731: ICD-10-CM

## 2021-04-12 DIAGNOSIS — H35.372: ICD-10-CM

## 2021-04-12 PROCEDURE — 67028 INJECTION EYE DRUG: CPT

## 2021-04-12 PROCEDURE — 92202 OPSCPY EXTND ON/MAC DRAW: CPT

## 2021-04-12 PROCEDURE — 92134 CPTRZ OPH DX IMG PST SGM RTA: CPT

## 2021-04-12 PROCEDURE — J0178PRE EYLEA PREFILLED SYRINGE

## 2021-04-12 PROCEDURE — 99213 OFFICE O/P EST LOW 20 MIN: CPT

## 2021-04-12 ASSESSMENT — VISUAL ACUITY
OD_SC: 20/60-2
OS_SC: 20/60+2

## 2021-04-12 ASSESSMENT — TONOMETRY
OS_IOP_MMHG: 19
OD_IOP_MMHG: 15

## 2021-05-10 ENCOUNTER — ESTABLISHED PATIENT (OUTPATIENT)
Dept: URBAN - METROPOLITAN AREA CLINIC 39 | Facility: CLINIC | Age: 86
End: 2021-05-10

## 2021-05-10 DIAGNOSIS — H35.731: ICD-10-CM

## 2021-05-10 DIAGNOSIS — H35.3211: ICD-10-CM

## 2021-05-10 DIAGNOSIS — H35.3122: ICD-10-CM

## 2021-05-10 DIAGNOSIS — H35.722: ICD-10-CM

## 2021-05-10 DIAGNOSIS — H35.81: ICD-10-CM

## 2021-05-10 DIAGNOSIS — H35.372: ICD-10-CM

## 2021-05-10 DIAGNOSIS — H35.033: ICD-10-CM

## 2021-05-10 PROCEDURE — 67028 INJECTION EYE DRUG: CPT

## 2021-05-10 PROCEDURE — J0178PRE EYLEA PREFILLED SYRINGE

## 2021-05-10 PROCEDURE — 92250 FUNDUS PHOTOGRAPHY W/I&R: CPT

## 2021-05-10 PROCEDURE — 99214 OFFICE O/P EST MOD 30 MIN: CPT

## 2021-05-10 ASSESSMENT — TONOMETRY
OS_IOP_MMHG: 18
OD_IOP_MMHG: 18

## 2021-05-10 ASSESSMENT — VISUAL ACUITY
OS_SC: 20/60-1
OD_SC: 20/60

## 2021-06-16 ENCOUNTER — ESTABLISHED PATIENT (OUTPATIENT)
Dept: URBAN - METROPOLITAN AREA CLINIC 39 | Facility: CLINIC | Age: 86
End: 2021-06-16

## 2021-06-16 DIAGNOSIS — H35.731: ICD-10-CM

## 2021-06-16 DIAGNOSIS — H35.3211: ICD-10-CM

## 2021-06-16 DIAGNOSIS — H35.722: ICD-10-CM

## 2021-06-16 DIAGNOSIS — H35.372: ICD-10-CM

## 2021-06-16 DIAGNOSIS — H35.81: ICD-10-CM

## 2021-06-16 DIAGNOSIS — H35.3122: ICD-10-CM

## 2021-06-16 PROCEDURE — 92202 OPSCPY EXTND ON/MAC DRAW: CPT

## 2021-06-16 PROCEDURE — 67028 INJECTION EYE DRUG: CPT

## 2021-06-16 PROCEDURE — 92242 FLUORESCEIN&ICG ANGIOGRAPHY: CPT

## 2021-06-16 PROCEDURE — 99213 OFFICE O/P EST LOW 20 MIN: CPT

## 2021-06-16 PROCEDURE — J0178PRE EYLEA PREFILLED SYRINGE

## 2021-06-16 PROCEDURE — 92134 CPTRZ OPH DX IMG PST SGM RTA: CPT

## 2021-06-16 ASSESSMENT — TONOMETRY
OS_IOP_MMHG: 18
OD_IOP_MMHG: 18

## 2021-06-16 ASSESSMENT — VISUAL ACUITY
OS_SC: 20/60-1
OS_PH: 20/50-2
OD_PH: 20/40-1
OD_SC: 20/60-1

## 2021-07-19 ENCOUNTER — ESTABLISHED PATIENT (OUTPATIENT)
Dept: URBAN - METROPOLITAN AREA CLINIC 39 | Facility: CLINIC | Age: 86
End: 2021-07-19

## 2021-07-19 DIAGNOSIS — H35.722: ICD-10-CM

## 2021-07-19 DIAGNOSIS — H35.3211: ICD-10-CM

## 2021-07-19 DIAGNOSIS — H35.372: ICD-10-CM

## 2021-07-19 DIAGNOSIS — H35.3122: ICD-10-CM

## 2021-07-19 DIAGNOSIS — H35.731: ICD-10-CM

## 2021-07-19 PROCEDURE — 92134 CPTRZ OPH DX IMG PST SGM RTA: CPT

## 2021-07-19 PROCEDURE — J0178PRE EYLEA PREFILLED SYRINGE

## 2021-07-19 PROCEDURE — 67028 INJECTION EYE DRUG: CPT

## 2021-07-19 PROCEDURE — 99213 OFFICE O/P EST LOW 20 MIN: CPT

## 2021-07-19 PROCEDURE — 92202 OPSCPY EXTND ON/MAC DRAW: CPT

## 2021-07-19 ASSESSMENT — VISUAL ACUITY
OS_SC: 20/50+2
OU_SC: 20/50+2
OD_SC: 20/70
OD_PH: 20/60
OS_PH: 20/40-2

## 2021-07-19 ASSESSMENT — TONOMETRY
OD_IOP_MMHG: 15
OS_IOP_MMHG: 14

## 2021-08-16 ENCOUNTER — ESTABLISHED PATIENT (OUTPATIENT)
Dept: URBAN - METROPOLITAN AREA CLINIC 39 | Facility: CLINIC | Age: 86
End: 2021-08-16

## 2021-08-16 DIAGNOSIS — H35.731: ICD-10-CM

## 2021-08-16 DIAGNOSIS — H35.3122: ICD-10-CM

## 2021-08-16 DIAGNOSIS — H35.372: ICD-10-CM

## 2021-08-16 DIAGNOSIS — H35.81: ICD-10-CM

## 2021-08-16 DIAGNOSIS — H35.3211: ICD-10-CM

## 2021-08-16 DIAGNOSIS — H35.722: ICD-10-CM

## 2021-08-16 PROCEDURE — 67028 INJECTION EYE DRUG: CPT

## 2021-08-16 PROCEDURE — J0178PRE EYLEA PREFILLED SYRINGE

## 2021-08-16 PROCEDURE — 99213 OFFICE O/P EST LOW 20 MIN: CPT

## 2021-08-16 PROCEDURE — 92250 FUNDUS PHOTOGRAPHY W/I&R: CPT

## 2021-08-16 ASSESSMENT — TONOMETRY
OS_IOP_MMHG: 17
OD_IOP_MMHG: 15

## 2021-08-16 ASSESSMENT — VISUAL ACUITY
OD_SC: 20/60+2
OS_SC: 20/50+2

## 2021-09-13 ENCOUNTER — ESTABLISHED PATIENT (OUTPATIENT)
Dept: URBAN - METROPOLITAN AREA CLINIC 39 | Facility: CLINIC | Age: 86
End: 2021-09-13

## 2021-09-13 DIAGNOSIS — H35.3211: ICD-10-CM

## 2021-09-13 DIAGNOSIS — H35.3122: ICD-10-CM

## 2021-09-13 DIAGNOSIS — H35.722: ICD-10-CM

## 2021-09-13 DIAGNOSIS — H35.372: ICD-10-CM

## 2021-09-13 DIAGNOSIS — H35.363: ICD-10-CM

## 2021-09-13 DIAGNOSIS — H35.81: ICD-10-CM

## 2021-09-13 DIAGNOSIS — H35.731: ICD-10-CM

## 2021-09-13 PROCEDURE — 67028 INJECTION EYE DRUG: CPT

## 2021-09-13 PROCEDURE — J0178PRE EYLEA PREFILLED SYRINGE

## 2021-09-13 PROCEDURE — 92202 OPSCPY EXTND ON/MAC DRAW: CPT

## 2021-09-13 PROCEDURE — 92242 FLUORESCEIN&ICG ANGIOGRAPHY: CPT

## 2021-09-13 PROCEDURE — 99213 OFFICE O/P EST LOW 20 MIN: CPT

## 2021-09-13 PROCEDURE — 92134 CPTRZ OPH DX IMG PST SGM RTA: CPT

## 2021-09-13 ASSESSMENT — VISUAL ACUITY
OS_SC: 20/50
OD_SC: 20/50-2

## 2021-09-13 ASSESSMENT — TONOMETRY
OD_IOP_MMHG: 16
OS_IOP_MMHG: 17

## 2021-09-20 NOTE — PROCEDURE NOTE: CLINICAL
PROCEDURE NOTE: Avastin ()(1 of 3) #1 OS. Diagnosis: Neovascular AMD with Active CNV. Anesthesia: Proparacaine 0.5%. Prep: Betadine Drops and Betadine Scrub. Prior to the original injection, risks/benefits/alternatives discussed including infection, loss of vision, hemorrhage, cataract, glaucoma, retinal tears or detachment. A written consent is on file, and the need for today’s injection was discussed and the patient is understanding and wishes to proceed. The off-label status of Intravitreal Avastin also was reviewed. The patient wished to proceed with treatment. The patient wished to proceed with treatment. Topical anesthetic drops were applied to the eye. Betadine prep was performed. Surgical mask worn. Sterile drape and lid speculum were applied. Using the syringe provided, Avastin 1.25 mg in 0.05 cc was injected into the vitreous cavity. Injection site: 3-4 mm from the limbus. Patient tolerated procedure well. Following the intravitreal injection, the sterile lid speculum was removed. CRA perfusion confirmed. CF vision checked. Patient given office phone number/answering service number and advised to call immediately should there be an increase in floaters or redness, loss of vision or pain, or should they have any other questions or concerns. Jeff Headley

## 2021-09-20 NOTE — PATIENT DISCUSSION
Pt now Wet AMD w/ active CNV.  Avastin #1(1 of 3) injection recommended today after discussion of benefits, risks, alternatives, and off-label use. The injection was administered without complication. Post-injection instructions were reviewed and understood by the patient.  Series of 3 x 4 weeks apart.

## 2021-10-18 ENCOUNTER — ESTABLISHED PATIENT (OUTPATIENT)
Dept: URBAN - METROPOLITAN AREA CLINIC 39 | Facility: CLINIC | Age: 86
End: 2021-10-18

## 2021-10-18 DIAGNOSIS — H35.731: ICD-10-CM

## 2021-10-18 DIAGNOSIS — H35.722: ICD-10-CM

## 2021-10-18 DIAGNOSIS — H35.3211: ICD-10-CM

## 2021-10-18 DIAGNOSIS — H35.3122: ICD-10-CM

## 2021-10-18 DIAGNOSIS — H35.033: ICD-10-CM

## 2021-10-18 DIAGNOSIS — H35.372: ICD-10-CM

## 2021-10-18 DIAGNOSIS — H35.81: ICD-10-CM

## 2021-10-18 PROCEDURE — 92202 OPSCPY EXTND ON/MAC DRAW: CPT

## 2021-10-18 PROCEDURE — 92134 CPTRZ OPH DX IMG PST SGM RTA: CPT

## 2021-10-18 PROCEDURE — 99213 OFFICE O/P EST LOW 20 MIN: CPT

## 2021-10-18 PROCEDURE — J0178PRE EYLEA PREFILLED SYRINGE

## 2021-10-18 PROCEDURE — 67028 INJECTION EYE DRUG: CPT

## 2021-10-18 ASSESSMENT — TONOMETRY
OS_IOP_MMHG: 13
OD_IOP_MMHG: 14

## 2021-10-18 ASSESSMENT — VISUAL ACUITY
OS_SC: 20/60+1
OD_SC: 20/70

## 2021-10-18 NOTE — PROCEDURE NOTE: CLINICAL
PROCEDURE NOTE: Avastin ()(2 of 3) #2 OS. Diagnosis: Neovascular AMD with Active CNV. Anesthesia: Proparacaine 0.5%. Prep: Betadine Drops and Betadine Scrub. Prior to the original injection, risks/benefits/alternatives discussed including infection, loss of vision, hemorrhage, cataract, glaucoma, retinal tears or detachment. A written consent is on file, and the need for today’s injection was discussed and the patient is understanding and wishes to proceed. The off-label status of Intravitreal Avastin also was reviewed. The patient wished to proceed with treatment. The patient wished to proceed with treatment. Topical anesthetic drops were applied to the eye. Betadine prep was performed. Surgical mask worn. Sterile drape and lid speculum were applied. Using the syringe provided, Avastin 1.25 mg in 0.05 cc was injected into the vitreous cavity. Injection site: 3-4 mm from the limbus. Patient tolerated procedure well. Following the intravitreal injection, the sterile lid speculum was removed. CRA perfusion confirmed. CF vision checked. Patient given office phone number/answering service number and advised to call immediately should there be an increase in floaters or redness, loss of vision or pain, or should they have any other questions or concerns. Essie Hebert

## 2021-10-18 NOTE — PATIENT DISCUSSION
Pt was previously referred to Dr. Rai Islas to eval possible Basil cell LLL. Patient has since had [facility] removed and is now being referred to Dr. Luly Trevino.

## 2021-10-18 NOTE — PATIENT DISCUSSION
Patient here today for  Avastin # 2 (2 of 3) injection recommended today after discussion of benefits, risks, alternatives, and previously discussed off-label use. The injection was administered without complication. Post-injection instructions were reviewed and understood by the patient.  Series of 3 x 4 weeks apart.

## 2021-10-25 NOTE — PATIENT DISCUSSION
Pt was previously referred to Dr. Slava Catherine to eval possible Basil cell LLL. Patient has since had [address] removed and is now being referred to Dr. Kike Calero.

## 2021-10-25 NOTE — PATIENT DISCUSSION
Recommended coordinated Mohs with Dr. Narciso Ontiveros and closure with Dr. Emory Lawrence Tentative surgery date of 12/6/2021.  Discussed surgery in detail.

## 2021-11-15 NOTE — PROCEDURE NOTE: CLINICAL
PROCEDURE NOTE: Avastin ()(3 of 3) #3 OS. Diagnosis: Neovascular AMD with Active CNV. Anesthesia: Proparacaine 0.5%. Prep: Betadine Drops and Betadine Scrub. Prior to the original injection, risks/benefits/alternatives discussed including infection, loss of vision, hemorrhage, cataract, glaucoma, retinal tears or detachment. A written consent is on file, and the need for today’s injection was discussed and the patient is understanding and wishes to proceed. The off-label status of Intravitreal Avastin also was reviewed. The patient wished to proceed with treatment. The patient wished to proceed with treatment. Topical anesthetic drops were applied to the eye. Betadine prep was performed. Surgical mask worn. Sterile drape and lid speculum were applied. Using the syringe provided, Avastin 1.25 mg in 0.05 cc was injected into the vitreous cavity. Injection site: 3-4 mm from the limbus. Patient tolerated procedure well. Following the intravitreal injection, the sterile lid speculum was removed. CRA perfusion confirmed. CF vision checked. Patient given office phone number/answering service number and advised to call immediately should there be an increase in floaters or redness, loss of vision or pain, or should they have any other questions or concerns. Cortney Merino

## 2021-11-15 NOTE — PATIENT DISCUSSION
Patient here today for  Avastin # 3 (3 of 3) injection recommended today after discussion of benefits, risks, alternatives, and previously discussed off-label use. The injection was administered without complication. Post-injection instructions were reviewed and understood by the patient.  Series of 3 x 4 weeks apart.

## 2021-11-29 ENCOUNTER — ESTABLISHED PATIENT (OUTPATIENT)
Dept: URBAN - METROPOLITAN AREA CLINIC 39 | Facility: CLINIC | Age: 86
End: 2021-11-29

## 2021-11-29 DIAGNOSIS — H35.722: ICD-10-CM

## 2021-11-29 DIAGNOSIS — H35.3122: ICD-10-CM

## 2021-11-29 DIAGNOSIS — H35.731: ICD-10-CM

## 2021-11-29 DIAGNOSIS — H35.81: ICD-10-CM

## 2021-11-29 DIAGNOSIS — H35.3211: ICD-10-CM

## 2021-11-29 PROCEDURE — J0178PRE EYLEA PREFILLED SYRINGE

## 2021-11-29 PROCEDURE — 92134 CPTRZ OPH DX IMG PST SGM RTA: CPT

## 2021-11-29 PROCEDURE — 99213 OFFICE O/P EST LOW 20 MIN: CPT

## 2021-11-29 PROCEDURE — 67028 INJECTION EYE DRUG: CPT

## 2021-11-29 ASSESSMENT — VISUAL ACUITY
OS_SC: 20/50+1
OS_PH: 20/50+2
OD_SC: 20/60-1

## 2021-11-29 ASSESSMENT — TONOMETRY
OD_IOP_MMHG: 15
OS_IOP_MMHG: 14

## 2022-01-31 ENCOUNTER — ESTABLISHED PATIENT (OUTPATIENT)
Dept: URBAN - METROPOLITAN AREA CLINIC 39 | Facility: CLINIC | Age: 87
End: 2022-01-31

## 2022-01-31 DIAGNOSIS — H35.3122: ICD-10-CM

## 2022-01-31 DIAGNOSIS — H35.033: ICD-10-CM

## 2022-01-31 DIAGNOSIS — H35.3211: ICD-10-CM

## 2022-01-31 DIAGNOSIS — H43.813: ICD-10-CM

## 2022-01-31 DIAGNOSIS — H35.722: ICD-10-CM

## 2022-01-31 DIAGNOSIS — H35.372: ICD-10-CM

## 2022-01-31 DIAGNOSIS — H35.363: ICD-10-CM

## 2022-01-31 DIAGNOSIS — H35.30: ICD-10-CM

## 2022-01-31 DIAGNOSIS — H35.731: ICD-10-CM

## 2022-01-31 PROCEDURE — 99213 OFFICE O/P EST LOW 20 MIN: CPT

## 2022-01-31 PROCEDURE — 92250 FUNDUS PHOTOGRAPHY W/I&R: CPT

## 2022-01-31 PROCEDURE — 67028 INJECTION EYE DRUG: CPT

## 2022-01-31 PROCEDURE — J0178PRE EYLEA PREFILLED SYRINGE

## 2022-01-31 ASSESSMENT — VISUAL ACUITY
OS_SC: 20/60
OD_SC: 20/60-2

## 2022-01-31 ASSESSMENT — TONOMETRY
OS_IOP_MMHG: 17
OD_IOP_MMHG: 15

## 2022-02-03 NOTE — PATIENT DISCUSSION
Pt was previously referred to Dr. Vivian Mcgill to eval possible Basil cell LLL. Patient has since had [address] removed and is now being referred to Dr. Niurka Solis.

## 2022-02-21 NOTE — PATIENT DISCUSSION
Pt was previously referred to Dr. Ramos Cuello to eval possible Basil cell LLL. Patient has since had [address] removed and is now being referred to Dr. Shayla Flynn.

## 2022-02-28 NOTE — PATIENT DISCUSSION
Pt was previously referred to Dr. Vangie Portillo to eval possible Basil cell LLL. Patient has since had [facility] removed and is now being referred to Dr. Sagrario Karimi.

## 2022-03-14 ENCOUNTER — CLINIC PROCEDURE ONLY (OUTPATIENT)
Dept: URBAN - METROPOLITAN AREA CLINIC 39 | Facility: CLINIC | Age: 87
End: 2022-03-14

## 2022-03-14 DIAGNOSIS — H35.731: ICD-10-CM

## 2022-03-14 DIAGNOSIS — H35.3211: ICD-10-CM

## 2022-03-14 PROCEDURE — 67028 INJECTION EYE DRUG: CPT

## 2022-03-14 PROCEDURE — 92134 CPTRZ OPH DX IMG PST SGM RTA: CPT

## 2022-03-14 PROCEDURE — J0178PRE EYLEA PREFILLED SYRINGE

## 2022-03-14 PROCEDURE — 92242 FLUORESCEIN&ICG ANGIOGRAPHY: CPT

## 2022-03-14 ASSESSMENT — TONOMETRY
OS_IOP_MMHG: 15
OD_IOP_MMHG: 13

## 2022-03-14 ASSESSMENT — VISUAL ACUITY
OS_SC: 20/60
OD_SC: 20/60-2

## 2022-05-02 ENCOUNTER — CLINIC PROCEDURE ONLY (OUTPATIENT)
Dept: URBAN - METROPOLITAN AREA CLINIC 39 | Facility: CLINIC | Age: 87
End: 2022-05-02

## 2022-05-02 DIAGNOSIS — H35.731: ICD-10-CM

## 2022-05-02 DIAGNOSIS — H35.3211: ICD-10-CM

## 2022-05-02 PROCEDURE — J0178PRE EYLEA PREFILLED SYRINGE

## 2022-05-02 PROCEDURE — 67028 INJECTION EYE DRUG: CPT

## 2022-05-02 PROCEDURE — 92250 FUNDUS PHOTOGRAPHY W/I&R: CPT

## 2022-05-02 ASSESSMENT — TONOMETRY
OS_IOP_MMHG: 16
OD_IOP_MMHG: 17

## 2022-05-02 ASSESSMENT — VISUAL ACUITY
OD_SC: 20/50
OS_SC: 20/60

## 2022-06-20 ENCOUNTER — ESTABLISHED PATIENT (OUTPATIENT)
Dept: URBAN - METROPOLITAN AREA CLINIC 39 | Facility: CLINIC | Age: 87
End: 2022-06-20

## 2022-06-20 DIAGNOSIS — H35.372: ICD-10-CM

## 2022-06-20 DIAGNOSIS — H35.731: ICD-10-CM

## 2022-06-20 DIAGNOSIS — H43.813: ICD-10-CM

## 2022-06-20 DIAGNOSIS — H35.3122: ICD-10-CM

## 2022-06-20 DIAGNOSIS — H35.363: ICD-10-CM

## 2022-06-20 DIAGNOSIS — H35.033: ICD-10-CM

## 2022-06-20 DIAGNOSIS — H35.3211: ICD-10-CM

## 2022-06-20 DIAGNOSIS — H35.722: ICD-10-CM

## 2022-06-20 PROCEDURE — 99213 OFFICE O/P EST LOW 20 MIN: CPT

## 2022-06-20 PROCEDURE — 67028 INJECTION EYE DRUG: CPT

## 2022-06-20 PROCEDURE — 92242 FLUORESCEIN&ICG ANGIOGRAPHY: CPT

## 2022-06-20 PROCEDURE — 92134 CPTRZ OPH DX IMG PST SGM RTA: CPT

## 2022-06-20 PROCEDURE — J0178PRE EYLEA PREFILLED SYRINGE

## 2022-06-20 ASSESSMENT — VISUAL ACUITY
OS_SC: 20/50-1
OD_SC: 20/60

## 2022-06-20 ASSESSMENT — TONOMETRY
OS_IOP_MMHG: 16
OD_IOP_MMHG: 18

## 2022-08-01 ENCOUNTER — CLINIC PROCEDURE ONLY (OUTPATIENT)
Dept: URBAN - METROPOLITAN AREA CLINIC 39 | Facility: CLINIC | Age: 87
End: 2022-08-01

## 2022-08-01 DIAGNOSIS — H35.3211: ICD-10-CM

## 2022-08-01 DIAGNOSIS — H35.731: ICD-10-CM

## 2022-08-01 PROCEDURE — 67028 INJECTION EYE DRUG: CPT

## 2022-08-01 PROCEDURE — 92250 FUNDUS PHOTOGRAPHY W/I&R: CPT

## 2022-08-01 PROCEDURE — J0178PRE EYLEA PREFILLED SYRINGE

## 2022-08-01 ASSESSMENT — TONOMETRY
OS_IOP_MMHG: 15
OD_IOP_MMHG: 14

## 2022-08-01 ASSESSMENT — VISUAL ACUITY
OS_SC: 20/50+1
OD_SC: 20/50-1

## 2022-11-07 ENCOUNTER — ESTABLISHED PATIENT (OUTPATIENT)
Dept: URBAN - METROPOLITAN AREA CLINIC 35 | Facility: CLINIC | Age: 87
End: 2022-11-07

## 2022-11-07 DIAGNOSIS — H35.372: ICD-10-CM

## 2022-11-07 DIAGNOSIS — H43.813: ICD-10-CM

## 2022-11-07 DIAGNOSIS — H35.30: ICD-10-CM

## 2022-11-07 DIAGNOSIS — H35.033: ICD-10-CM

## 2022-11-07 DIAGNOSIS — H35.722: ICD-10-CM

## 2022-11-07 DIAGNOSIS — H35.3122: ICD-10-CM

## 2022-11-07 DIAGNOSIS — H35.3211: ICD-10-CM

## 2022-11-07 DIAGNOSIS — H35.731: ICD-10-CM

## 2022-11-07 DIAGNOSIS — H35.363: ICD-10-CM

## 2022-11-07 PROCEDURE — 92242 FLUORESCEIN&ICG ANGIOGRAPHY: CPT

## 2022-11-07 PROCEDURE — 99213 OFFICE O/P EST LOW 20 MIN: CPT

## 2022-11-07 PROCEDURE — 92134 CPTRZ OPH DX IMG PST SGM RTA: CPT

## 2022-11-07 PROCEDURE — J0178PRE EYLEA PREFILLED SYRINGE

## 2022-11-07 PROCEDURE — 67028 INJECTION EYE DRUG: CPT

## 2022-11-07 ASSESSMENT — TONOMETRY
OS_IOP_MMHG: 16
OD_IOP_MMHG: 14

## 2022-11-07 ASSESSMENT — VISUAL ACUITY
OS_SC: 20/30-2
OD_SC: 20/40+2

## 2023-01-11 ENCOUNTER — CLINIC PROCEDURE ONLY (OUTPATIENT)
Dept: URBAN - METROPOLITAN AREA CLINIC 39 | Facility: CLINIC | Age: 88
End: 2023-01-11

## 2023-01-11 DIAGNOSIS — H35.3211: ICD-10-CM

## 2023-01-11 DIAGNOSIS — H35.372: ICD-10-CM

## 2023-01-11 DIAGNOSIS — H35.722: ICD-10-CM

## 2023-01-11 PROCEDURE — 92134 CPTRZ OPH DX IMG PST SGM RTA: CPT

## 2023-01-11 PROCEDURE — 92242 FLUORESCEIN&ICG ANGIOGRAPHY: CPT

## 2023-01-11 PROCEDURE — J0178PRE EYLEA PREFILLED SYRINGE

## 2023-01-11 PROCEDURE — 67028 INJECTION EYE DRUG: CPT

## 2023-01-11 ASSESSMENT — VISUAL ACUITY
OD_SC: 20/50+2
OS_SC: 20/40-2

## 2023-01-11 ASSESSMENT — TONOMETRY
OD_IOP_MMHG: 20
OS_IOP_MMHG: 16

## 2023-04-19 ENCOUNTER — CLINIC PROCEDURE ONLY (OUTPATIENT)
Dept: URBAN - METROPOLITAN AREA CLINIC 39 | Facility: CLINIC | Age: 88
End: 2023-04-19

## 2023-04-19 DIAGNOSIS — H35.3211: ICD-10-CM

## 2023-04-19 DIAGNOSIS — H35.731: ICD-10-CM

## 2023-04-19 PROCEDURE — 67028 INJECTION EYE DRUG: CPT

## 2023-04-19 PROCEDURE — 92250 FUNDUS PHOTOGRAPHY W/I&R: CPT

## 2023-04-19 PROCEDURE — J0178PRE EYLEA PREFILLED SYRINGE

## 2023-04-19 ASSESSMENT — VISUAL ACUITY
OS_SC: 20/40-1
OD_SC: 20/50-1

## 2023-04-19 ASSESSMENT — TONOMETRY
OD_IOP_MMHG: 19
OS_IOP_MMHG: 18

## 2023-06-27 ENCOUNTER — PREPPED CHART (OUTPATIENT)
Dept: URBAN - METROPOLITAN AREA CLINIC 39 | Facility: CLINIC | Age: 88
End: 2023-06-27

## 2023-07-26 ENCOUNTER — ESTABLISHED PATIENT (OUTPATIENT)
Dept: URBAN - METROPOLITAN AREA CLINIC 39 | Facility: CLINIC | Age: 88
End: 2023-07-26

## 2023-07-26 DIAGNOSIS — H35.3122: ICD-10-CM

## 2023-07-26 DIAGNOSIS — H35.033: ICD-10-CM

## 2023-07-26 DIAGNOSIS — H35.3211: ICD-10-CM

## 2023-07-26 DIAGNOSIS — H43.813: ICD-10-CM

## 2023-07-26 DIAGNOSIS — H35.372: ICD-10-CM

## 2023-07-26 DIAGNOSIS — H35.731: ICD-10-CM

## 2023-07-26 PROCEDURE — 92250 FUNDUS PHOTOGRAPHY W/I&R: CPT

## 2023-07-26 PROCEDURE — 99214 OFFICE O/P EST MOD 30 MIN: CPT

## 2023-07-26 PROCEDURE — 67028 INJECTION EYE DRUG: CPT

## 2023-07-26 PROCEDURE — J0178PRE EYLEA PREFILLED SYRINGE

## 2023-07-26 ASSESSMENT — VISUAL ACUITY
OD_PH: 20/60-1
OD_SC: 20/70

## 2023-07-26 ASSESSMENT — TONOMETRY
OD_IOP_MMHG: 20
OS_IOP_MMHG: 17

## 2023-09-12 ENCOUNTER — CLINIC PROCEDURE ONLY (OUTPATIENT)
Dept: URBAN - METROPOLITAN AREA CLINIC 39 | Facility: CLINIC | Age: 88
End: 2023-09-12

## 2023-09-12 DIAGNOSIS — H35.3211: ICD-10-CM

## 2023-09-12 PROCEDURE — 92242 FLUORESCEIN&ICG ANGIOGRAPHY: CPT

## 2023-09-12 PROCEDURE — J0178PRE EYLEA PREFILLED SYRINGE

## 2023-09-12 PROCEDURE — 92134 CPTRZ OPH DX IMG PST SGM RTA: CPT

## 2023-09-12 PROCEDURE — 67028 INJECTION EYE DRUG: CPT

## 2023-09-12 ASSESSMENT — VISUAL ACUITY
OD_SC: 20/60
OD_PH: 20/30-1
OS_SC: 20/50

## 2023-09-12 ASSESSMENT — TONOMETRY
OS_IOP_MMHG: 17
OD_IOP_MMHG: 18

## 2023-10-24 ENCOUNTER — ESTABLISHED PATIENT (OUTPATIENT)
Dept: URBAN - METROPOLITAN AREA CLINIC 39 | Facility: CLINIC | Age: 88
End: 2023-10-24

## 2023-10-24 DIAGNOSIS — D31.31: ICD-10-CM

## 2023-10-24 DIAGNOSIS — H35.731: ICD-10-CM

## 2023-10-24 DIAGNOSIS — H35.30: ICD-10-CM

## 2023-10-24 DIAGNOSIS — H35.363: ICD-10-CM

## 2023-10-24 DIAGNOSIS — H35.722: ICD-10-CM

## 2023-10-24 DIAGNOSIS — H35.3122: ICD-10-CM

## 2023-10-24 DIAGNOSIS — H35.3211: ICD-10-CM

## 2023-10-24 DIAGNOSIS — H35.372: ICD-10-CM

## 2023-10-24 DIAGNOSIS — H35.033: ICD-10-CM

## 2023-10-24 DIAGNOSIS — H43.813: ICD-10-CM

## 2023-10-24 DIAGNOSIS — H40.053: ICD-10-CM

## 2023-10-24 PROCEDURE — 67028 INJECTION EYE DRUG: CPT

## 2023-10-24 PROCEDURE — 92250 FUNDUS PHOTOGRAPHY W/I&R: CPT

## 2023-10-24 PROCEDURE — 99214 OFFICE O/P EST MOD 30 MIN: CPT | Mod: 25

## 2023-10-24 ASSESSMENT — TONOMETRY
OS_IOP_MMHG: 23
OD_IOP_MMHG: 23

## 2023-10-24 ASSESSMENT — VISUAL ACUITY
OS_SC: 20/50+1
OD_SC: 20/40
OS_PH: 20/30

## 2023-10-27 ENCOUNTER — FOLLOW UP (OUTPATIENT)
Dept: URBAN - METROPOLITAN AREA CLINIC 39 | Facility: CLINIC | Age: 88
End: 2023-10-27

## 2023-10-27 DIAGNOSIS — H40.053: ICD-10-CM

## 2023-10-27 DIAGNOSIS — H35.3211: ICD-10-CM

## 2023-10-27 DIAGNOSIS — H35.3122: ICD-10-CM

## 2023-10-27 PROCEDURE — 92012 INTRM OPH EXAM EST PATIENT: CPT

## 2023-10-27 ASSESSMENT — TONOMETRY
OD_IOP_MMHG: 16
OS_IOP_MMHG: 20

## 2023-10-27 ASSESSMENT — VISUAL ACUITY
OS_SC: 20/40-1
OU_SC: 20/40+2
OD_SC: 20/40-1

## 2023-12-05 ENCOUNTER — CLINIC PROCEDURE ONLY (OUTPATIENT)
Dept: URBAN - METROPOLITAN AREA CLINIC 39 | Facility: CLINIC | Age: 88
End: 2023-12-05

## 2023-12-05 DIAGNOSIS — H35.731: ICD-10-CM

## 2023-12-05 DIAGNOSIS — H35.3211: ICD-10-CM

## 2023-12-05 PROCEDURE — 92250 FUNDUS PHOTOGRAPHY W/I&R: CPT

## 2023-12-05 PROCEDURE — 67028 INJECTION EYE DRUG: CPT

## 2023-12-05 ASSESSMENT — TONOMETRY
OS_IOP_MMHG: 18
OD_IOP_MMHG: 19

## 2023-12-05 ASSESSMENT — VISUAL ACUITY
OS_SC: 20/30-1
OD_SC: 20/40

## 2024-01-30 ENCOUNTER — ESTABLISHED PATIENT (OUTPATIENT)
Dept: URBAN - METROPOLITAN AREA CLINIC 39 | Facility: CLINIC | Age: 89
End: 2024-01-30

## 2024-01-30 DIAGNOSIS — H35.3211: ICD-10-CM

## 2024-01-30 DIAGNOSIS — H40.053: ICD-10-CM

## 2024-01-30 DIAGNOSIS — H35.731: ICD-10-CM

## 2024-01-30 DIAGNOSIS — D31.31: ICD-10-CM

## 2024-01-30 DIAGNOSIS — H43.813: ICD-10-CM

## 2024-01-30 DIAGNOSIS — H35.722: ICD-10-CM

## 2024-01-30 DIAGNOSIS — H35.30: ICD-10-CM

## 2024-01-30 DIAGNOSIS — H35.363: ICD-10-CM

## 2024-01-30 DIAGNOSIS — H35.3122: ICD-10-CM

## 2024-01-30 DIAGNOSIS — H35.372: ICD-10-CM

## 2024-01-30 DIAGNOSIS — H35.033: ICD-10-CM

## 2024-01-30 PROCEDURE — 92242 FLUORESCEIN&ICG ANGIOGRAPHY: CPT

## 2024-01-30 PROCEDURE — 92134 CPTRZ OPH DX IMG PST SGM RTA: CPT

## 2024-01-30 PROCEDURE — 99213 OFFICE O/P EST LOW 20 MIN: CPT

## 2024-01-30 PROCEDURE — 67028 INJECTION EYE DRUG: CPT

## 2024-01-30 ASSESSMENT — VISUAL ACUITY
OD_SC: 20/40
OS_SC: 20/40-1

## 2024-01-30 ASSESSMENT — TONOMETRY
OD_IOP_MMHG: 20
OS_IOP_MMHG: 18

## 2024-05-07 ENCOUNTER — ESTABLISHED PATIENT (OUTPATIENT)
Dept: URBAN - METROPOLITAN AREA CLINIC 39 | Facility: CLINIC | Age: 89
End: 2024-05-07

## 2024-05-07 DIAGNOSIS — H35.372: ICD-10-CM

## 2024-05-07 DIAGNOSIS — H35.722: ICD-10-CM

## 2024-05-07 DIAGNOSIS — H43.813: ICD-10-CM

## 2024-05-07 DIAGNOSIS — H35.363: ICD-10-CM

## 2024-05-07 DIAGNOSIS — H35.30: ICD-10-CM

## 2024-05-07 DIAGNOSIS — D31.31: ICD-10-CM

## 2024-05-07 DIAGNOSIS — H35.033: ICD-10-CM

## 2024-05-07 DIAGNOSIS — H35.731: ICD-10-CM

## 2024-05-07 DIAGNOSIS — H35.3122: ICD-10-CM

## 2024-05-07 DIAGNOSIS — H35.3211: ICD-10-CM

## 2024-05-07 PROCEDURE — 67028 INJECTION EYE DRUG: CPT

## 2024-05-07 PROCEDURE — 92250 FUNDUS PHOTOGRAPHY W/I&R: CPT

## 2024-05-07 PROCEDURE — 99213 OFFICE O/P EST LOW 20 MIN: CPT | Mod: 25

## 2024-05-07 ASSESSMENT — TONOMETRY
OD_IOP_MMHG: 17
OS_IOP_MMHG: 14

## 2024-05-07 ASSESSMENT — VISUAL ACUITY
OD_SC: 20/50
OS_SC: 20/40

## 2024-07-30 ENCOUNTER — CLINIC PROCEDURE ONLY (OUTPATIENT)
Dept: URBAN - METROPOLITAN AREA CLINIC 39 | Facility: CLINIC | Age: 89
End: 2024-07-30

## 2024-07-30 DIAGNOSIS — H35.3211: ICD-10-CM

## 2024-07-30 DIAGNOSIS — H35.731: ICD-10-CM

## 2024-07-30 PROCEDURE — 67028 INJECTION EYE DRUG: CPT

## 2024-07-30 PROCEDURE — 92134 CPTRZ OPH DX IMG PST SGM RTA: CPT

## 2024-07-30 PROCEDURE — 92242 FLUORESCEIN&ICG ANGIOGRAPHY: CPT

## 2024-07-30 ASSESSMENT — TONOMETRY
OD_IOP_MMHG: 15
OS_IOP_MMHG: 12

## 2024-07-30 ASSESSMENT — VISUAL ACUITY
OS_SC: 20/50-1
OD_PH: 20/40
OD_SC: 20/60-1

## 2024-12-13 ENCOUNTER — COMPREHENSIVE EXAM (OUTPATIENT)
Age: 89
End: 2024-12-13

## 2024-12-13 DIAGNOSIS — D31.31: ICD-10-CM

## 2024-12-13 DIAGNOSIS — H43.813: ICD-10-CM

## 2024-12-13 DIAGNOSIS — H35.033: ICD-10-CM

## 2024-12-13 DIAGNOSIS — H35.722: ICD-10-CM

## 2024-12-13 DIAGNOSIS — H35.3211: ICD-10-CM

## 2024-12-13 DIAGNOSIS — H35.731: ICD-10-CM

## 2024-12-13 DIAGNOSIS — H35.372: ICD-10-CM

## 2024-12-13 DIAGNOSIS — H35.3122: ICD-10-CM

## 2024-12-13 DIAGNOSIS — H04.123: ICD-10-CM

## 2024-12-13 PROCEDURE — 67028 INJECTION EYE DRUG: CPT

## 2024-12-13 PROCEDURE — J2777PFS VABYSMO PFS: Mod: JZ,RT

## 2024-12-13 PROCEDURE — 99213 OFFICE O/P EST LOW 20 MIN: CPT | Mod: 25

## 2024-12-13 PROCEDURE — 92250 FUNDUS PHOTOGRAPHY W/I&R: CPT

## 2025-02-07 ENCOUNTER — CLINIC PROCEDURE ONLY (OUTPATIENT)
Age: OVER 89
End: 2025-02-07

## 2025-08-22 ENCOUNTER — COMPREHENSIVE EXAM (OUTPATIENT)
Age: OVER 89
End: 2025-08-22

## 2025-08-22 DIAGNOSIS — H35.363: ICD-10-CM

## 2025-08-22 DIAGNOSIS — H35.30: ICD-10-CM

## 2025-08-22 DIAGNOSIS — H35.731: ICD-10-CM

## 2025-08-22 DIAGNOSIS — H35.372: ICD-10-CM

## 2025-08-22 DIAGNOSIS — D31.31: ICD-10-CM

## 2025-08-22 DIAGNOSIS — H35.3211: ICD-10-CM

## 2025-08-22 DIAGNOSIS — H35.033: ICD-10-CM

## 2025-08-22 DIAGNOSIS — H43.813: ICD-10-CM

## 2025-08-22 DIAGNOSIS — H35.3122: ICD-10-CM

## 2025-08-22 DIAGNOSIS — H04.123: ICD-10-CM

## 2025-08-22 DIAGNOSIS — H35.722: ICD-10-CM

## 2025-08-22 PROCEDURE — J2777PFS VABYSMO PFS: Mod: JZ,RT

## 2025-08-22 PROCEDURE — 99213 OFFICE O/P EST LOW 20 MIN: CPT | Mod: 25

## 2025-08-22 PROCEDURE — 67028 INJECTION EYE DRUG: CPT

## 2025-08-22 PROCEDURE — 92250 FUNDUS PHOTOGRAPHY W/I&R: CPT

## 2025-08-22 PROCEDURE — 92134 CPTRZ OPH DX IMG PST SGM RTA: CPT

## 2025-08-22 PROCEDURE — 92273 FULL FIELD ERG W/I&R: CPT
